# Patient Record
Sex: FEMALE | Race: WHITE
[De-identification: names, ages, dates, MRNs, and addresses within clinical notes are randomized per-mention and may not be internally consistent; named-entity substitution may affect disease eponyms.]

---

## 2018-07-13 ENCOUNTER — HOSPITAL ENCOUNTER (OUTPATIENT)
Age: 58
End: 2018-07-13
Payer: COMMERCIAL

## 2018-07-13 DIAGNOSIS — Z12.31: Primary | ICD-10-CM

## 2018-07-13 PROCEDURE — 77067 SCR MAMMO BI INCL CAD: CPT

## 2018-07-13 PROCEDURE — 77063 BREAST TOMOSYNTHESIS BI: CPT

## 2018-07-13 NOTE — DI.MG.S_ITS
BILATERAL DIGITAL SCREENING MAMMOGRAM 3D/2D WITH CAD: 7/13/2018  
CLINICAL: Routine screening.    
   
Comparison is made to exams dated:  4/20/2017 mammogram, 8/24/2015 mammogram, and   
8/5/2014 mammogram - [facility].    
The tissue of both breasts is predominantly fatty.    
Current study was also evaluated with a Computer Aided Detection (CAD) system.    
There is a high density asymmetry in the right breast anterior depth superior region seen   
on the mediolateral oblique view only.    
No other significant masses, calcifications, or other findings are seen in either breast.   
   
   
IMPRESSION: INCOMPLETE: NEEDS ADDITIONAL IMAGING EVALUATION  
The high density asymmetry in the right breast is indeterminate.  Additional views with   
possible ultrasound are recommended.    
   
   
This exam was interpreted at Station ID: [id number].    
   
NOTE: For mammograms, a report in lay terms will be sent to the patient. Approximately   
15% of breast malignancies will not be visualized mammographically. In the management of   
a palpable breast mass, a negative mammogram must not discourage biopsy of a clinically   
suspicious lesion.  
   
Electronically Signed By: Karla cronin/kendrick:7/13/2018 16:04:19    
   
   
letter sent: Additional Imaging Needed    
ACR BI-RADS Category 0: Incomplete 3340F

## 2018-07-25 ENCOUNTER — HOSPITAL ENCOUNTER (OUTPATIENT)
Age: 58
End: 2018-07-25
Payer: COMMERCIAL

## 2018-07-25 DIAGNOSIS — N63.10: ICD-10-CM

## 2018-07-25 DIAGNOSIS — R92.8: Primary | ICD-10-CM

## 2018-07-25 PROCEDURE — 77065 DX MAMMO INCL CAD UNI: CPT

## 2018-07-25 PROCEDURE — 76642 ULTRASOUND BREAST LIMITED: CPT

## 2018-07-25 NOTE — DI.US.S_ITS
ULTRASOUND OF RIGHT BREAST: 7/25/2018  
CLINICAL: Patient returns for additional imaging over a suspected mass in the right   
breast.    
   
Comparison is made to exams dated:  7/25/2018 mammogram, 7/13/2018 mammogram, and   
4/20/2017 mammogram - [facility].    
Color flow ultrasound of the right breast was performed.  Gray scale images of the   
real-time examination were reviewed.    
   
Questioned mammographic finding corresponds to benign dialated subareolar ducts.    
IMPRESSION: NEGATIVE   
There is no sonographic evidence of malignancy.    
A 1 year screening mammogram is recommended.     
   
This exam was interpreted at Station ID: [id number].    
Electronically Signed By: Prateek Melgar M.D.    
cj/:7/25/2018 22:50:08    
   
   
letter sent: Normal Exam    
Ultrasound BI-RADS: 1 Negative

## 2018-07-25 NOTE — DI.MG.S_ITS
UNILATERAL RIGHT DIGITAL DIAGNOSTIC MAMMOGRAM 3D/2D WITH ADDITIONAL VIEWS: 7/25/2018  
CLINICAL: Additional evaluation requested from prior study.    
   
Comparison is made to exams dated:  7/13/2018 mammogram, 4/20/2017 mammogram, and   
8/24/2015 mammogram - [facility].    
There are scattered fibroglandular elements in the right breast.    
There is a 6 mm oval low density mass with a circumscribed margin in the right breast   
central to the nipple in the retroareolar region.    
No other significant masses or calcifications are seen in the breast.    
   
IMPRESSION: INCOMPLETE: NEEDS ADDITIONAL IMAGING EVALUATION  
The 6 mm oval low density mass in the right breast is indeterminate.  An ultrasound is   
recommended.    
   
   
This exam was interpreted at Station ID: [id number].    
   
NOTE: For mammograms, a report in lay terms will be sent to the patient. Approximately   
15% of breast malignancies will not be visualized mammographically. In the management of   
a palpable breast mass, a negative mammogram must not discourage biopsy of a clinically   
suspicious lesion.  
   
Electronically Signed By: Prateek harris/kendrick:7/25/2018 22:49:07    
   
   
   
ACR BI-RADS Category 0: Incomplete 3340F

## 2019-07-24 ENCOUNTER — HOSPITAL ENCOUNTER (OUTPATIENT)
Age: 59
End: 2019-07-24
Payer: COMMERCIAL

## 2019-07-24 DIAGNOSIS — Z12.31: Primary | ICD-10-CM

## 2019-07-24 PROCEDURE — 77067 SCR MAMMO BI INCL CAD: CPT

## 2019-07-24 PROCEDURE — 77063 BREAST TOMOSYNTHESIS BI: CPT

## 2019-07-24 NOTE — DI.MG.S_ITS
BILATERAL DIGITAL SCREENING MAMMOGRAM 3D/2D WITH CAD: 7/24/2019  
CLINICAL: Routine screening.    
   
Comparison is made to exams dated:  7/25/2018 mammogram, 7/13/2018 mammogram, 4/20/2017   
mammogram, and 8/24/2015 mammogram - [facility].  There are scattered fibroglandular   
elements in both breasts.    
   
Current study was also evaluated with a Computer Aided Detection (CAD) system.  A cardiac   
pacer device projects over the superior left breast at posterior depth, resulting in   
attenuation/imaging artifact of the left breast.  
No significant masses, calcifications, or other findings are seen in either breast.    
There has been no significant interval change.  
   
IMPRESSION: NEGATIVE  
There is no mammographic evidence of malignancy. A 1 year screening mammogram is   
recommended.     
   
This exam was interpreted at Station ID: [id number].    
   
NOTE: For mammograms, a report in lay terms will be sent to the patient. Approximately   
15% of breast malignancies will not be visualized mammographically. In the management of   
a palpable breast mass, a negative mammogram must not discourage biopsy of a clinically   
suspicious lesion.  
   
Electronically Signed By: Efraín Mayes M.D.            
ecl/:7/25/2019 09:46:37    
   
   
letter sent: Normal Exam    
ACR BI-RADS Category 1: Negative 3341F

## 2020-07-27 ENCOUNTER — HOSPITAL ENCOUNTER (OUTPATIENT)
Age: 60
End: 2020-07-27
Payer: COMMERCIAL

## 2020-07-27 DIAGNOSIS — Z12.31: Primary | ICD-10-CM

## 2020-07-27 PROCEDURE — 77067 SCR MAMMO BI INCL CAD: CPT

## 2020-07-27 PROCEDURE — 77063 BREAST TOMOSYNTHESIS BI: CPT

## 2021-08-03 ENCOUNTER — HOSPITAL ENCOUNTER (OUTPATIENT)
Age: 61
End: 2021-08-03
Payer: COMMERCIAL

## 2021-08-03 DIAGNOSIS — Z12.31: Primary | ICD-10-CM

## 2021-08-03 PROCEDURE — 77067 SCR MAMMO BI INCL CAD: CPT

## 2021-08-03 PROCEDURE — 77063 BREAST TOMOSYNTHESIS BI: CPT

## 2022-08-22 ENCOUNTER — HOSPITAL ENCOUNTER (OUTPATIENT)
Age: 62
End: 2022-08-22
Payer: COMMERCIAL

## 2022-08-22 DIAGNOSIS — Z12.31: Primary | ICD-10-CM

## 2022-08-22 PROCEDURE — 77063 BREAST TOMOSYNTHESIS BI: CPT

## 2022-08-22 PROCEDURE — 77067 SCR MAMMO BI INCL CAD: CPT

## 2023-08-25 ENCOUNTER — HOSPITAL ENCOUNTER (OUTPATIENT)
Age: 63
End: 2023-08-25
Payer: COMMERCIAL

## 2023-08-25 DIAGNOSIS — Z12.31: Primary | ICD-10-CM

## 2023-08-25 PROCEDURE — 77067 SCR MAMMO BI INCL CAD: CPT

## 2023-08-25 PROCEDURE — 77063 BREAST TOMOSYNTHESIS BI: CPT

## 2024-09-11 ENCOUNTER — HOSPITAL ENCOUNTER (OUTPATIENT)
Dept: HOSPITAL 73 - MAMMO | Age: 64
End: 2024-09-11
Payer: COMMERCIAL

## 2024-09-11 DIAGNOSIS — Z12.31: Primary | ICD-10-CM

## 2024-09-11 PROCEDURE — 77067 SCR MAMMO BI INCL CAD: CPT

## 2024-09-11 PROCEDURE — 77063 BREAST TOMOSYNTHESIS BI: CPT

## 2024-09-11 NOTE — DI.MG.S_ITS
BILATERAL DIGITAL SCREENING MAMMOGRAM 3D/2D WITH CAD: 9/11/2024 
  
CLINICAL: Routine screening.   
  
Comparison is made to exams dated:  8/25/2023 mammogram, 8/22/2022 mammogram, 8/3/2021  
mammogram, and 7/27/2020 mammogram - [facility].   
  
There are scattered areas of fibroglandular density (category b / 25%-50% glandular  
tissue).   
  
Current study was also evaluated with a Computer Aided Detection (CAD) system.   
No significant masses, calcifications, or other findings are seen in either breast.   
There has been no significant interval change. 
  
IMPRESSION: NEGATIVE 
There is no mammographic evidence of malignancy. A 1 year screening mammogram is  
recommended.   
  
Based on the Tyrer Cuzick model (a risk assessment model) the patient's lifetime risk is  
8.9% and her 10 year risk is 3.9%. According to the ACR, ACS, and NCCN guidelines, an  
annual breast MRI exam along with mammogram is recommended if the patient's lifetime risk  
is 20% or greater. 
  
  
This exam was interpreted at Station ID: [id number].   
  
NOTE: For mammograms, a report in lay terms will be sent to the patient. Approximately  
15% of breast malignancies will not be visualized mammographically. In the management of  
a palpable breast mass, a negative mammogram must not discourage biopsy of a clinically  
suspicious lesion. 
  
Electronically Signed By: Audra Michaels M.D., Ph.D.           
isacc/kendrick:9/11/2024 10:11:19   
  
  
letter sent: Normal Exam   
ACR BI-RADS Category 1: Negative 3341F